# Patient Record
Sex: MALE | Race: OTHER | URBAN - METROPOLITAN AREA
[De-identification: names, ages, dates, MRNs, and addresses within clinical notes are randomized per-mention and may not be internally consistent; named-entity substitution may affect disease eponyms.]

---

## 2017-11-12 ENCOUNTER — HOSPITAL ENCOUNTER (OUTPATIENT)
Facility: HOSPITAL | Age: 27
Discharge: HOME | End: 2017-11-12
Attending: ORTHOPAEDIC SURGERY | Admitting: ORTHOPAEDIC SURGERY

## 2017-11-12 ENCOUNTER — APPOINTMENT (OUTPATIENT)
Dept: GENERAL RADIOLOGY | Facility: HOSPITAL | Age: 27
End: 2017-11-12

## 2017-11-12 ENCOUNTER — SURGERY (OUTPATIENT)
Age: 27
End: 2017-11-12

## 2017-11-12 ENCOUNTER — ANESTHESIA EVENT (OUTPATIENT)
Dept: SURGERY | Facility: HOSPITAL | Age: 27
End: 2017-11-12

## 2017-11-12 ENCOUNTER — ANESTHESIA (OUTPATIENT)
Dept: SURGERY | Facility: HOSPITAL | Age: 27
End: 2017-11-12

## 2017-11-12 VITALS
DIASTOLIC BLOOD PRESSURE: 74 MMHG | SYSTOLIC BLOOD PRESSURE: 133 MMHG | RESPIRATION RATE: 14 BRPM | HEART RATE: 69 BPM | OXYGEN SATURATION: 100 % | BODY MASS INDEX: 29.82 KG/M2 | TEMPERATURE: 98 F | WEIGHT: 225 LBS | HEIGHT: 73 IN

## 2017-11-12 DIAGNOSIS — S63.259A DISLOCATION OF FINGER, INITIAL ENCOUNTER: Primary | ICD-10-CM

## 2017-11-12 PROCEDURE — 85025 COMPLETE CBC W/AUTO DIFF WBC: CPT | Performed by: ORTHOPAEDIC SURGERY

## 2017-11-12 PROCEDURE — 0PSV04Z REPOSITION LEFT FINGER PHALANX WITH INTERNAL FIXATION DEVICE, OPEN APPROACH: ICD-10-PCS | Performed by: ORTHOPAEDIC SURGERY

## 2017-11-12 PROCEDURE — 26770 TREAT FINGER DISLOCATION: CPT

## 2017-11-12 PROCEDURE — 0MQ80ZZ REPAIR LEFT HAND BURSA AND LIGAMENT, OPEN APPROACH: ICD-10-PCS | Performed by: ORTHOPAEDIC SURGERY

## 2017-11-12 PROCEDURE — 73140 X-RAY EXAM OF FINGER(S): CPT

## 2017-11-12 PROCEDURE — 99285 EMERGENCY DEPT VISIT HI MDM: CPT

## 2017-11-12 PROCEDURE — 80048 BASIC METABOLIC PNL TOTAL CA: CPT | Performed by: ORTHOPAEDIC SURGERY

## 2017-11-12 PROCEDURE — 36415 COLL VENOUS BLD VENIPUNCTURE: CPT

## 2017-11-12 DEVICE — MINI QUICKANCHOR PLUS (NUMBER 2/0 SUTURE) SIZE 2/0 (3 METRIC) ORTHOCORD BRAIDED COMPOSITE SUTURE, 18 INCHES (45CM), DOUBLE-ARMED V-5 NEEDLES AND 2.0 X 9.7MM DRILL BIT, WITH DISPOSABLE INSERTER.
Type: IMPLANTABLE DEVICE | Site: FINGER | Status: FUNCTIONAL
Brand: QUICKANCHOR ORTHOCORD

## 2017-11-12 DEVICE — KWIRE: Type: IMPLANTABLE DEVICE | Site: FINGER | Status: FUNCTIONAL

## 2017-11-12 RX ORDER — HYDROMORPHONE HYDROCHLORIDE 1 MG/ML
0.4 INJECTION, SOLUTION INTRAMUSCULAR; INTRAVENOUS; SUBCUTANEOUS EVERY 5 MIN PRN
Status: DISCONTINUED | OUTPATIENT
Start: 2017-11-12 | End: 2017-11-12 | Stop reason: HOSPADM

## 2017-11-12 RX ORDER — MORPHINE SULFATE 10 MG/ML
6 INJECTION, SOLUTION INTRAMUSCULAR; INTRAVENOUS EVERY 10 MIN PRN
Status: DISCONTINUED | OUTPATIENT
Start: 2017-11-12 | End: 2017-11-12 | Stop reason: HOSPADM

## 2017-11-12 RX ORDER — BUPIVACAINE HYDROCHLORIDE 5 MG/ML
INJECTION, SOLUTION EPIDURAL; INTRACAUDAL AS NEEDED
Status: DISCONTINUED | OUTPATIENT
Start: 2017-11-12 | End: 2017-11-12 | Stop reason: HOSPADM

## 2017-11-12 RX ORDER — MORPHINE SULFATE 2 MG/ML
2 INJECTION, SOLUTION INTRAMUSCULAR; INTRAVENOUS EVERY 10 MIN PRN
Status: DISCONTINUED | OUTPATIENT
Start: 2017-11-12 | End: 2017-11-12 | Stop reason: HOSPADM

## 2017-11-12 RX ORDER — MIDAZOLAM HYDROCHLORIDE 1 MG/ML
INJECTION INTRAMUSCULAR; INTRAVENOUS AS NEEDED
Status: DISCONTINUED | OUTPATIENT
Start: 2017-11-12 | End: 2017-11-12 | Stop reason: SURG

## 2017-11-12 RX ORDER — ONDANSETRON 2 MG/ML
4 INJECTION INTRAMUSCULAR; INTRAVENOUS ONCE AS NEEDED
Status: DISCONTINUED | OUTPATIENT
Start: 2017-11-12 | End: 2017-11-12 | Stop reason: HOSPADM

## 2017-11-12 RX ORDER — DEXAMETHASONE SODIUM PHOSPHATE 4 MG/ML
VIAL (ML) INJECTION AS NEEDED
Status: DISCONTINUED | OUTPATIENT
Start: 2017-11-12 | End: 2017-11-12 | Stop reason: SURG

## 2017-11-12 RX ORDER — MORPHINE SULFATE 4 MG/ML
4 INJECTION, SOLUTION INTRAMUSCULAR; INTRAVENOUS EVERY 10 MIN PRN
Status: DISCONTINUED | OUTPATIENT
Start: 2017-11-12 | End: 2017-11-12 | Stop reason: HOSPADM

## 2017-11-12 RX ORDER — BUPIVACAINE HYDROCHLORIDE 5 MG/ML
10 INJECTION, SOLUTION EPIDURAL; INTRACAUDAL ONCE
Status: COMPLETED | OUTPATIENT
Start: 2017-11-12 | End: 2017-11-12

## 2017-11-12 RX ORDER — HYDROCODONE BITARTRATE AND ACETAMINOPHEN 5; 325 MG/1; MG/1
1 TABLET ORAL EVERY 4 HOURS PRN
Qty: 25 TABLET | Refills: 0 | Status: SHIPPED | OUTPATIENT
Start: 2017-11-12

## 2017-11-12 RX ORDER — SODIUM CHLORIDE 9 MG/ML
125 INJECTION, SOLUTION INTRAVENOUS CONTINUOUS
Status: DISCONTINUED | OUTPATIENT
Start: 2017-11-12 | End: 2017-11-13

## 2017-11-12 RX ORDER — HALOPERIDOL 5 MG/ML
0.25 INJECTION INTRAMUSCULAR ONCE AS NEEDED
Status: DISCONTINUED | OUTPATIENT
Start: 2017-11-12 | End: 2017-11-12 | Stop reason: HOSPADM

## 2017-11-12 RX ORDER — HYDROMORPHONE HYDROCHLORIDE 1 MG/ML
0.2 INJECTION, SOLUTION INTRAMUSCULAR; INTRAVENOUS; SUBCUTANEOUS EVERY 5 MIN PRN
Status: DISCONTINUED | OUTPATIENT
Start: 2017-11-12 | End: 2017-11-12 | Stop reason: HOSPADM

## 2017-11-12 RX ORDER — HYDROCODONE BITARTRATE AND ACETAMINOPHEN 5; 325 MG/1; MG/1
2 TABLET ORAL AS NEEDED
Status: DISCONTINUED | OUTPATIENT
Start: 2017-11-12 | End: 2017-11-12 | Stop reason: HOSPADM

## 2017-11-12 RX ORDER — LIDOCAINE HYDROCHLORIDE 10 MG/ML
INJECTION, SOLUTION EPIDURAL; INFILTRATION; INTRACAUDAL; PERINEURAL AS NEEDED
Status: DISCONTINUED | OUTPATIENT
Start: 2017-11-12 | End: 2017-11-12 | Stop reason: SURG

## 2017-11-12 RX ORDER — SODIUM CHLORIDE, SODIUM LACTATE, POTASSIUM CHLORIDE, CALCIUM CHLORIDE 600; 310; 30; 20 MG/100ML; MG/100ML; MG/100ML; MG/100ML
INJECTION, SOLUTION INTRAVENOUS CONTINUOUS
Status: DISCONTINUED | OUTPATIENT
Start: 2017-11-12 | End: 2017-11-13

## 2017-11-12 RX ORDER — NALOXONE HYDROCHLORIDE 0.4 MG/ML
80 INJECTION, SOLUTION INTRAMUSCULAR; INTRAVENOUS; SUBCUTANEOUS AS NEEDED
Status: DISCONTINUED | OUTPATIENT
Start: 2017-11-12 | End: 2017-11-12 | Stop reason: HOSPADM

## 2017-11-12 RX ORDER — ONDANSETRON 2 MG/ML
INJECTION INTRAMUSCULAR; INTRAVENOUS AS NEEDED
Status: DISCONTINUED | OUTPATIENT
Start: 2017-11-12 | End: 2017-11-12 | Stop reason: SURG

## 2017-11-12 RX ORDER — HYDROCODONE BITARTRATE AND ACETAMINOPHEN 5; 325 MG/1; MG/1
1 TABLET ORAL AS NEEDED
Status: DISCONTINUED | OUTPATIENT
Start: 2017-11-12 | End: 2017-11-12 | Stop reason: HOSPADM

## 2017-11-12 RX ORDER — HYDROMORPHONE HYDROCHLORIDE 1 MG/ML
0.6 INJECTION, SOLUTION INTRAMUSCULAR; INTRAVENOUS; SUBCUTANEOUS EVERY 5 MIN PRN
Status: DISCONTINUED | OUTPATIENT
Start: 2017-11-12 | End: 2017-11-12 | Stop reason: HOSPADM

## 2017-11-12 RX ADMIN — DEXAMETHASONE SODIUM PHOSPHATE 4 MG: 4 MG/ML VIAL (ML) INJECTION at 18:04:00

## 2017-11-12 RX ADMIN — SODIUM CHLORIDE: 9 INJECTION, SOLUTION INTRAVENOUS at 19:29:00

## 2017-11-12 RX ADMIN — ONDANSETRON 4 MG: 2 INJECTION INTRAMUSCULAR; INTRAVENOUS at 18:04:00

## 2017-11-12 RX ADMIN — SODIUM CHLORIDE: 9 INJECTION, SOLUTION INTRAVENOUS at 18:00:00

## 2017-11-12 RX ADMIN — MIDAZOLAM HYDROCHLORIDE 2 MG: 1 INJECTION INTRAMUSCULAR; INTRAVENOUS at 18:04:00

## 2017-11-12 RX ADMIN — LIDOCAINE HYDROCHLORIDE 50 MG: 10 INJECTION, SOLUTION EPIDURAL; INFILTRATION; INTRACAUDAL; PERINEURAL at 18:04:00

## 2017-11-12 NOTE — ED INITIAL ASSESSMENT (HPI)
Left second finger injury with swelling.  Patient was playing basketball today and got hit by a ball

## 2017-11-12 NOTE — ED PROVIDER NOTES
Patient Seen in: [facility] AND [facility] Emergency Department    History   Patient presents with:  Musculoskeletal Problem    Stated Complaint: left finger injury    Patient presents into the emergency room for evaluation of the left index finger dislocation. index finger: There is tenderness accompanied with an obvious dislocation at the PIP joint. No tenderness of the middle phalanx DIP joint or distal phalanx. Sensation is intact to the distal digit. No open wounds or signs of open dislocation.    Neurolog pm    Follow-up:  No follow-up provider specified. Medications Prescribed:  There are no discharge medications for this patient.           Claudia MAHER

## 2017-11-12 NOTE — CONSULTS
[facility]    Report of Consultation    Jostin Vásquez Patient Status:  Emergency    1990 MRN [id number]   Location [address] [facility] Attending Juan Lyman   Hosp Day # 0 PCP None Pcp     Date negative  Musculoskeletal:negative, positive as per HPI  Neurological: negative  Behavioral/Psych: negative  Endocrine: negative  Allergic/Immunologic: negative    Physical Exam:   Blood pressure 143/85, pulse 82, temperature 98.1 °F (36.7 °C), temperature joint    Recommendations:  A comprehensive clinical history and physical examination is performed. X-rays are reviewed. Patient has a closed dislocation of the left index finger proximal interphalangeal joint. There are no associated fractures.   Closed

## 2017-11-13 NOTE — ANESTHESIA PREPROCEDURE EVALUATION
Anesthesia PreOp Note    HPI:     Sabina Taylor is a 32year old male who presents for preoperative consultation requested by: Nathan Christian MD    Date of Surgery: 11/12/2017    Procedure(s):  ORIF METACARPAL  Indication: Finger disloca PRN Lucretia Roy MD    fentaNYL citrate (SUBLIMAZE) 0.05 MG/ML injection 50 mcg 50 mcg Intravenous Q5 Min PRN Lucretia Roy MD    HYDROmorphone HCl PF (DILAUDID) 1 MG/ML injection 0.2 mg 0.2 mg Intravenous Q5 Min PRN Lucretia Roy MD HCT 49.4 11/12/2017   MCV 91.4 11/12/2017   MCH 31.0 11/12/2017   MCHC 33.9 11/12/2017   RDW 12.4 11/12/2017    11/12/2017   MPV 8.8 11/12/2017       Lab Results  Component Value Date    (L) 11/12/2017   K 4.2 11/12/2017    11/12/2017

## 2017-11-13 NOTE — BRIEF OP NOTE
Pre-Operative Diagnosis: Left index Finger dislocation, initial encounter      Post-Operative Diagnosis:  Left index Finger dislocation, initial encounter.   Rupture of proximal interphalangeal volar plate ligament     Procedure Performed:   Procedure(s)

## 2017-11-13 NOTE — ANESTHESIA POSTPROCEDURE EVALUATION
Patient: Arik Mauricio    Procedure Summary     Date:  11/12/17 Room / Location:  [address] [place] [address] MAIN OR 05 / [facility] MAIN OR    Anesthesia Start:  1800 Anesthesia Stop:  1931    Procedure:  ORIF METACARPAL (Left Hand) Diagnosis:       Finger dislocation, initia

## 2017-11-13 NOTE — OPERATIVE REPORT
[facility]    PATIENT'S NAME: Levywes Tellez   ATTENDING PHYSICIAN: Antony Kaur MD   OPERATING PHYSICIAN: Antony Kaur MD   PATIENT ACCOUNT#:   [id number]    LOCATION:  [address] PACU [address] [place] [address]  MEDICAL RECORD was applied to the left upper arm. The left arm was then prepped and draped in usual sterile fashion. A time-out was performed, confirming the patient and surgery to be performed.   The left arm was exsanguinated using an Esmarch, and the tourniquet was i volar plate had avulsed, contributing to the instability. The digit was then reduced again, and a dorsal block Ish wire was placed into the proximal phalanx head.   A 0.045 Ish wire was placed through the proximal interphalangeal joint maríain

## 2024-07-10 ENCOUNTER — ORDER TRANSCRIPTION (OUTPATIENT)
Dept: PHYSICAL THERAPY | Facility: HOSPITAL | Age: 34
End: 2024-07-10

## 2024-07-10 DIAGNOSIS — M25.562 LEFT KNEE PAIN: Primary | ICD-10-CM

## 2024-07-10 DIAGNOSIS — G89.29 OTHER CHRONIC PAIN: ICD-10-CM

## 2024-09-30 ENCOUNTER — OFFICE VISIT (OUTPATIENT)
Dept: PHYSICAL THERAPY | Age: 34
End: 2024-09-30
Payer: COMMERCIAL

## 2024-09-30 DIAGNOSIS — G89.29 OTHER CHRONIC PAIN: ICD-10-CM

## 2024-09-30 DIAGNOSIS — M25.562 LEFT KNEE PAIN: Primary | ICD-10-CM

## 2024-09-30 PROCEDURE — 97161 PT EVAL LOW COMPLEX 20 MIN: CPT | Performed by: PHYSICAL THERAPIST

## 2024-09-30 PROCEDURE — 97110 THERAPEUTIC EXERCISES: CPT | Performed by: PHYSICAL THERAPIST

## 2024-09-30 NOTE — PROGRESS NOTES
P.T. EVALUATION:   Referring Physician: Dr. Michel  Diagnosis: Left knee pain (M25.562)  Other chronic pain (G89.29)    Date of Onset: 7/10/2024 Date of Service: 9/30/2024     PATIENT SUMMARY   Patient verbalized consent for Physical Therapy evaluation and treatment.  Ganga Hernandez is a 34 year old y/o male who presents to therapy today with complaints of L/R knee pain.   Pt describes pain level 7/10.   History of current condition: patient with a history of chronic R knee pain. States he noticed his L knee started hurting. Denies trauma or but he states he does a lot of walking and standing. Persistent pain prompting him to see MD. Now referred to PT for eval and tx.  Current functional limitations include decreased knee mobility. Difficulty with prolonged walking, squatting, stepping up and down stairs.   Ganga describes prior level of function full mobility and normal tolerance to activities. Pt goals include relief from pain and be able to move better.  Past medical history was reviewed with Ganga. Patient  has no past medical history on file. Other co-morbidities include history of R knee injury for several years ago.        ASSESSMENT:   Referred to PT for eval and tx due to L knee pain. Patient presents with the following limitations: decreased knee mobility. Difficulty with prolonged walking, squatting, stepping up and down stairs.  Ganga would benefit from skilled Physical Therapy to address the above impairments to relieve pain and improve mobility.    Precautions:  None     OBJECTIVE:   Observation: Alert and oriented x 3. Ambulatory into department.  Language Line  provided: Fay: [id number]    Palpation: (-) tenderness around B knees    ROM: R knee ROM 15 to 130, L knee 5-130    Accessory motion: WNL patellar mobility B knees    Flexibility: (+) tightness B hamstrings and calves    Strength/MMT: B knees grossly 4/5 into extension and flexion. Rest of B LE's grossly  5/5.    Sensation: WNL to light touch    Special tests: (-) B knees varus or valgus stress test, (-) anterior and posterior drawers test.    Posture: (-) LE deviations    Balance: WFL    Gait: Walks independently. Demonstrates decreased push off on B LE's.    Fall History for the past 2 years: NA    Functional Outcome Measure: LEFS Score  No data recorded QNR not available in translated version    Today’s Treatment and Response:  Patient education provided on PT eval findings, treatment plan, goals, HEP.  Patient received today:  PT Eval Low Complexity,   Therapeutic Exercises x 25min: Shuttle LE leg press, B LE knee AROM. Instructed on HEP. Handouts were created and issued to patient.    Charges: PT Eval Low Complexity, Thera Ex2      Total Timed Treatment: 25 min     Total Treatment Time: 45 min         PLAN OF CARE:    Goals: to be met in 6 weeks  Patient will be independent with HEP, its progression, and management of residual symptoms.  Patient will report knee pain of not more than 1/10 during activity.  Increase knee ROM to WNL into extension to improve flexibility.  Increase LE strength to 5/5 throughout to be able to resume previous activities without any difficulty.  Patient will demonstrate normal gait pattern during ambulation activities including stairs.    Frequency / Duration: Patient will be seen for 1 x/week or a total of 5-6 visits over a 90 day period. Treatment will include: Modalities prn, manual therapy, therapeutic exercises, therapeutic activity, and instructions on a home program.     Education or treatment limitation: None    Rehab Potential:good    Patient/Family/Caregiver was advised of these findings, precautions, and treatment options and has agreed to actively participate in planning and for this course of care.    Thank you for your referral. Please co-sign or sign and return this letter via fax as soon as possible to [phone]. If you have any questions, please contact me at Dept:  [phone]    Sincerely,  Electronically signed by therapist: Ilya Vann PT    Physician's certification required: Yes  I certify the need for these services furnished under this plan of treatment and while under my care.      X___________________________________________________ Date____________________    Certification From: 9/30/2024  To:12/29/2024

## 2024-10-07 ENCOUNTER — OFFICE VISIT (OUTPATIENT)
Dept: PHYSICAL THERAPY | Age: 34
End: 2024-10-07
Payer: COMMERCIAL

## 2024-10-07 PROCEDURE — 97110 THERAPEUTIC EXERCISES: CPT | Performed by: PHYSICAL THERAPIST

## 2024-10-07 PROCEDURE — 97112 NEUROMUSCULAR REEDUCATION: CPT | Performed by: PHYSICAL THERAPIST

## 2024-10-07 NOTE — PROGRESS NOTES
Diagnosis: Left knee pain (M25.562)  Other chronic pain (G89.29)        Next MD visit: none scheduled  Fall Risk: standard         Precautions: n/a          Medication Changes since last visit?: No      Subjective: States his knee is feeling better.  Pt describes pain level 4/10.     Objective:  R knee ROM 15 to 130, L knee 5-130   B knees grossly 4/5 into extension and flexion. Rest of B LE's grossly 5/5.     Assessment: Demonstrates improved LE mobility. Still with knee pain but is significantly reduced. Patient and PT goals are in progress.  Goals:  Patient will be independent with HEP, its progression, and management of residual symptoms.  Patient will report knee pain of not more than 1/10 during activity.  Increase knee ROM to WNL into extension to improve flexibility.  Increase LE strength to 5/5 throughout to be able to resume previous activities without any difficulty.  Patient will demonstrate normal gait pattern during ambulation activities including stairs.    Plan: Continue PT to decrease knee pain and improve LE mobility.     10/7/2024  Visit#: 2/5 Access [facility]   Thera Ex   40min  - calf stretch on L3 slant board 30 sec x 3  - B LE heel raises on L3 slant board 10 x 2  - knee flexion stretch on stairs 10 x 5 sec hold  - yellow theraband resisted LE abduction, extension 10 x 2  - Shuttle B LE leg press 7 bands 10 x 3  - Shuttle single LE leg press 5 bands 10 x 3  - seated hamstring curls 10# 10 x 3  - Neuro re-ed  - Nustep L5 x 10min with cueing     Manual PT      Neuromuscular Re-education   X10min  - alternate forward lunges on BOSU with 5lb db  - side lunges on BOSU with 5lb db  - forward step up on BOSU 10 x 2  - side step out form BOSU 10 x 2     Modalities                      Charges: EX3, Neuro re-ed1       Total Timed Treatment: 50 min  Total Treatment Time: 50 min

## 2024-10-09 ENCOUNTER — OFFICE VISIT (OUTPATIENT)
Dept: PHYSICAL THERAPY | Age: 34
End: 2024-10-09
Payer: COMMERCIAL

## 2024-10-09 PROCEDURE — 97110 THERAPEUTIC EXERCISES: CPT | Performed by: PHYSICAL THERAPIST

## 2024-10-09 PROCEDURE — 97112 NEUROMUSCULAR REEDUCATION: CPT | Performed by: PHYSICAL THERAPIST

## 2024-10-09 NOTE — PROGRESS NOTES
Diagnosis: Left knee pain (M25.562)  Other chronic pain (G89.29)        Next MD visit: none scheduled  Fall Risk: standard         Precautions: n/a          Medication Changes since last visit?: No      Subjective: States his knee is feeling better. Reports 0/10 pain at rest.  Pt describes pain level up to 4/10 when squatting.    Objective:  R knee ROM 10 to 130, L knee 5-130   B knees grossly 4/5 into extension and flexion. Rest of B LE's grossly 5/5.     Assessment: Demonstrates improved LE extension. Still with knee pain during squat activity or when going up and down steps. Patient and PT goals are in progress.  Goals:  Patient will be independent with HEP, its progression, and management of residual symptoms.  Patient will report knee pain of not more than 1/10 during activity.  Increase knee ROM to WNL into extension to improve flexibility.  Increase LE strength to 5/5 throughout to be able to resume previous activities without any difficulty.  Patient will demonstrate normal gait pattern during ambulation activities including stairs.    Plan: Continue PT to decrease knee pain and improve LE mobility.     10/9/2024  Visit#: 3/5 Access [facility] 10/7/2024  Visit#: 2/5 [facility]   Thera Ex   40min  - calf stretch on L3 slant board 30 sec x 3  - B LE heel raises on L3 slant board 10 x 2  - knee flexion stretch on 16 inch 10 x 5 sec hold  - red theraband resisted LE abduction, extension 10 x 2  - Shuttle B LE leg press 7 bands 10 x 3  - Shuttle single LE leg press 5 bands 10 x 3  - seated hamstring curls 10# 10 x 3  - Neuro re-ed  - Nustep L5 x 10min with cueing 40min  - calf stretch on L3 slant board 30 sec x 3  - B LE heel raises on L3 slant board 10 x 2  - knee flexion stretch on stairs 10 x 5 sec hold  - yellow theraband resisted LE abduction, extension 10 x 2  - Shuttle B LE leg press 7 bands 10 x 3  - Shuttle single LE leg press 5 bands 10 x 3  - seated hamstring curls 10# 10 x 3  - Neuro re-ed  - Nustep  L5 x 10min with cueing     Manual PT       Neuromuscular Re-education   X10min to improve LE control and stability.  - alternate forward lunges on BOSU with 5lb db  - side lunges on BOSU with 5lb db  - forward step up on BOSU 10 x 2  - side step out form BOSU 10 x 2 X10min  - alternate forward lunges on BOSU with 5lb db  - side lunges on BOSU with 5lb db  - forward step up on BOSU 10 x 2  - side step out form BOSU 10 x 2     Modalities                         Charges: EX3, Neuro re-ed1       Total Timed Treatment: 50 min  Total Treatment Time: 50 min

## 2024-10-14 ENCOUNTER — OFFICE VISIT (OUTPATIENT)
Dept: PHYSICAL THERAPY | Age: 34
End: 2024-10-14
Payer: COMMERCIAL

## 2024-10-14 PROCEDURE — 97110 THERAPEUTIC EXERCISES: CPT | Performed by: PHYSICAL THERAPIST

## 2024-10-14 PROCEDURE — 97112 NEUROMUSCULAR REEDUCATION: CPT | Performed by: PHYSICAL THERAPIST

## 2024-10-14 NOTE — PROGRESS NOTES
Diagnosis: Left knee pain (M25.562)  Other chronic pain (G89.29)        Next MD visit: none scheduled  Fall Risk: standard         Precautions: n/a          Medication Changes since last visit?: No      Subjective: States his knee still hurts with prolonged walking.  Pt describes pain level up to 4/10 when squatting.    Objective:  R knee ROM 10 to 130, L knee 5-130   B knees grossly 4/5 into extension and flexion. Rest of B LE's grossly 5/5.     Assessment: Demonstrates improved LE extension. Still with knee pain that limits prolonged activity. Patient and PT goals are in progress.  Goals:  Patient will be independent with HEP, its progression, and management of residual symptoms.  Patient will report knee pain of not more than 1/10 during activity.  Increase knee ROM to WNL into extension to improve flexibility.  Increase LE strength to 5/5 throughout to be able to resume previous activities without any difficulty.  Patient will demonstrate normal gait pattern during ambulation activities including stairs.    Plan: Continue PT to decrease knee pain and improve LE mobility.     10/14/2024  Visit#: 4/5 Access [facility] 10/9/2024  Visit#: 3/5 Access [facility] 10/7/2024  Visit#: 2/5 Access [facility]   Thera Ex   40min  - calf stretch on L3 slant board 30 sec x 3  - B LE heel raises on L3 slant board 10 x 2  - knee flexion stretch on 16 inch step 10 x 2  - Shuttle B LE leg press 7 bands 10 x 3  - Shuttle single LE leg press 5 bands 10 x 3  - seated hamstring curls 10# 10 x 3  - red theraband resisted LE abduction, extension 10 x 2  - Neuro re-ed  - Nustep L5 x 10min with cueing 40min  - calf stretch on L3 slant board 30 sec x 3  - B LE heel raises on L3 slant board 10 x 2  - knee flexion stretch on 16 inch 10 x 5 sec hold  - red theraband resisted LE abduction, extension 10 x 2  - Shuttle B LE leg press 7 bands 10 x 3  - Shuttle single LE leg press 5 bands 10 x 3  - seated hamstring curls 10# 10 x 3  - Neuro re-ed  - Nustep  L5 x 10min with cueing 40min  - calf stretch on L3 slant board 30 sec x 3  - B LE heel raises on L3 slant board 10 x 2  - knee flexion stretch on stairs 10 x 5 sec hold  - yellow theraband resisted LE abduction, extension 10 x 2  - Shuttle B LE leg press 7 bands 10 x 3  - Shuttle single LE leg press 5 bands 10 x 3  - seated hamstring curls 10# 10 x 3  - Neuro re-ed  - Nustep L5 x 10min with cueing     Manual PT        Neuromuscular Re-education   X10min to improve LE control and stability.  - alternate forward lunges on BOSU with 5lb db  - side lunges on BOSU with 5lb db  - forward step up on BOSU 10 x 2  - side step out form BOSU 10 x 2 X10min to improve LE control and stability.  - alternate forward lunges on BOSU with 5lb db  - side lunges on BOSU with 5lb db  - forward step up on BOSU 10 x 2  - side step out form BOSU 10 x 2 X10min  - alternate forward lunges on BOSU with 5lb db  - side lunges on BOSU with 5lb db  - forward step up on BOSU 10 x 2  - side step out form BOSU 10 x 2     Modalities                            Charges: EX3, Neuro re-ed1       Total Timed Treatment: 50 min  Total Treatment Time: 50 min

## 2024-10-16 ENCOUNTER — OFFICE VISIT (OUTPATIENT)
Dept: PHYSICAL THERAPY | Age: 34
End: 2024-10-16
Payer: COMMERCIAL

## 2024-10-16 PROCEDURE — 97530 THERAPEUTIC ACTIVITIES: CPT | Performed by: PHYSICAL THERAPIST

## 2024-10-16 PROCEDURE — 97110 THERAPEUTIC EXERCISES: CPT | Performed by: PHYSICAL THERAPIST

## 2024-10-16 PROCEDURE — 97112 NEUROMUSCULAR REEDUCATION: CPT | Performed by: PHYSICAL THERAPIST

## 2024-10-16 NOTE — PROGRESS NOTES
Progress Summary  Pt has attended 5 visits in Physical Therapy.     Diagnosis: Left knee pain (M25.562)  Other chronic pain (G89.29)        Next MD visit: none scheduled  Fall Risk: standard         Precautions: n/a          Medication Changes since last visit?: No      Subjective: States his knee is feeling better.  Pt describes pain level up to 0/10 at this time.    Objective:  R knee ROM 10 to 130, L knee 5-130   B knees grossly 4/5 into extension and flexion. Rest of B LE's grossly 5/5.     Assessment: Referred to PT due to initial complaints of L knee pain. Patient has completed 5 PT visits. States his knee is feeling better overall. Reports 0/10 knee pain. L knee ROM and strength are WFL into all planes. Demonstrates improved LE extension. Patient and PT goals have been met. Reports persistent L knee crepitus during prolonged activities  but is able to do all of his previous activities without difficulty.  Goals:  Patient will be independent with HEP, its progression, and management of residual symptoms.  Patient will report knee pain of not more than 1/10 during activity.  Increase knee ROM to WNL into extension to improve flexibility.  Increase LE strength to 5/5 throughout to be able to resume previous activities without any difficulty.  Patient will demonstrate normal gait pattern during ambulation activities including stairs.       10/16/2024  Visit#: 5/5 Access [facility] 10/14/2024  Visit#: 4/5 Access [facility] 10/9/2024  Visit#: 3/5 Access [facility] 10/7/2024  Visit#: 2/5 Access [facility]   Thera Ex   40min  - calf stretch on L3 slant board 30 sec  x 3  - B LE heel raises on L3 slant board 10x  2  - knee flexion stretch on 16 inch step 10 x 2  - Shuttle B LE leg press 7 bands 10 x 3  - Shuttle single LE leg press 5 bands 10 x 3  - seated hamstring curls 10# 10 x 3  - red theraband resisted LE abduction, extension 10 x 2  - forward and side step lunges  - Neuro re-ed  - Upright bike x 10min with cueing   40min  -  calf stretch on L3 slant board 30 sec x 3  - B LE heel raises on L3 slant board 10 x 2  - knee flexion stretch on 16 inch step 10 x 2  - Shuttle B LE leg press 7 bands 10 x 3  - Shuttle single LE leg press 5 bands 10 x 3  - seated hamstring curls 10# 10 x 3  - red theraband resisted LE abduction, extension 10 x 2  - Neuro re-ed  - Nustep L5 x 10min with cueing 40min  - calf stretch on L3 slant board 30 sec x 3  - B LE heel raises on L3 slant board 10 x 2  - knee flexion stretch on 16 inch 10 x 5 sec hold  - red theraband resisted LE abduction, extension 10 x 2  - Shuttle B LE leg press 7 bands 10 x 3  - Shuttle single LE leg press 5 bands 10 x 3  - seated hamstring curls 10# 10 x 3  - Neuro re-ed  - Nustep L5 x 10min with cueing 40min  - calf stretch on L3 slant board 30 sec x 3  - B LE heel raises on L3 slant board 10 x 2  - knee flexion stretch on stairs 10 x 5 sec hold  - yellow theraband resisted LE abduction, extension 10 x 2  - Shuttle B LE leg press 7 bands 10 x 3  - Shuttle single LE leg press 5 bands 10 x 3  - seated hamstring curls 10# 10 x 3  - Neuro re-ed  - Nustep L5 x 10min with cueing     Manual PT         Neuromuscular Re-education    X10min to improve LE control and stability.  - alternate forward lunges on BOSU with 5lb db  - side lunges on BOSU with 5lb db  - forward step up on BOSU 10 x 2  - side step out form BOSU 10 x 2 X10min to improve LE control and stability.  - alternate forward lunges on BOSU with 5lb db  - side lunges on BOSU with 5lb db  - forward step up on BOSU 10 x 2  - side step out form BOSU 10 x 2 X10min  - alternate forward lunges on BOSU with 5lb db  - side lunges on BOSU with 5lb db  - forward step up on BOSU 10 x 2  - side step out form BOSU 10 x 2     Modalities         Therapeutic Activity X10min  - simulated squat lifts x 2min  - simulated stair activity x 2min  - reviewed home exercises                       Charges: EX3, Neuro re-ed1       Total Timed Treatment: 50  min  Total Treatment Time: 50 min    LEFS Score  No data recorded  Post LEFS Score  No data recorded     Translated QNR not available    Plan: Discharged from PT. Patient will continue with HEP and follow up with MD as needed.    Patient/Family/Caregiver was advised of these findings, precautions, and treatment options and has agreed to actively participate in planning and for this course of care.    Thank you for your referral. If you have any questions, please contact me at Dept: [phone].    Sincerely,  Electronically signed by therapist: Ilya Vann Jr., PT

## (undated) DEVICE — DRAPE MN CARM

## (undated) DEVICE — CHLORAPREP 26ML APPLICATOR

## (undated) DEVICE — UPPER EXTREMITY: Brand: MEDLINE INDUSTRIES, INC.

## (undated) DEVICE — SOL IRRIG NACL 1000CC BTL .9%

## (undated) DEVICE — SCANLAN® PINCAP® ORTHOPEDIC PIN PROTECTORS - WHITE, FITS 3 PIN/WIRE SIZES: 0.71, 0.89, 1.14 MM (2/STERILE PKG): Brand: SCANLAN® PINCAP® ORTHOPEDIC PIN PROTECTORS

## (undated) DEVICE — WEBRIL COTTON UNDERCAST PADDING: Brand: WEBRIL

## (undated) DEVICE — STERILE POLYISOPRENE POWDER-FREE SURGICAL GLOVES: Brand: PROTEXIS

## (undated) DEVICE — COVER SGL STRL LGHT HNDL BLU

## (undated) DEVICE — GOWN SURG AERO BLUE PERF XXLG

## (undated) DEVICE — KENDALL SCD EXPRESS SLEEVES, KNEE LENGTH, MEDIUM: Brand: KENDALL SCD

## (undated) DEVICE — SUTURE VICRYL 4-0 J494G

## (undated) DEVICE — BIPOLAR FORCEPS CORD,BANANA LEADS: Brand: VALLEYLAB

## (undated) DEVICE — OCCLUSIVE GAUZE STRIP,3% BISMUTH TRIBROMOPHENATE IN PETROLATUM BLEND: Brand: XEROFORM

## (undated) DEVICE — ZIMMER® A.T.S. CYLINDRICAL TOURNIQUET CUFF, DUAL PORT, SINGLE BLADDER 18 IN. (46 CM)

## (undated) DEVICE — STERILE TETRA-FLEX CF, ELASTIC BANDAGE, 2" X 5.5YD: Brand: TETRA-FLEX™CF

## (undated) DEVICE — SOL H2O 1000ML BTL

## (undated) DEVICE — SUTURE ETHILON 4-0 699G

## (undated) DEVICE — BATTERY

## (undated) DEVICE — SPLINT PRECUT ORTHOGLASS 3X12

## (undated) NOTE — LETTER
[facility]  Administration of Anesthesia  1. I, Addie Nuñez, or _________________________________ acting on his behalf, (Patient) (Dependent/Representative) request to receive anesthesia for my pending procedure/operation/treatm infections, high spinal block, spinal bleeding, seizure, cardiac arrest and death. 7. AWARENESS: I understand that it is possible (but unlikely) to have explicit memory of events from the operating room while under general anesthesia.   8. ELECTROCONVULSIV (Date) (Time)                                                                                               (Responsible person in case of minor/ unconscious pt) /Relationship    My signature below affirms that prior to the time of the procedure, I have ex

## (undated) NOTE — LETTER
[address], [place], [place]     AUTHORIZATION FOR SURGICAL OPERATION OR PROCEDURE    1.  I hereby authorize Dr. Haritha Bentley MD, my Physician(s) and whomever may be designated as the doctor's Assistant, to Radha [place] 5. I consent to the photographing of procedure(s) to be performed for the purposes of advancing medicine, science and/or education, provided my identity is not revealed.  If the procedure has been videotaped, the physician/surgeon will obtain the original v (Witness signature)                                                                                                  (Date)                                (Time)  STATEMENT OF PHYSICIAN My signature below affirms that prior to the time of the procedure;  I